# Patient Record
Sex: MALE | Race: WHITE | NOT HISPANIC OR LATINO | Employment: UNEMPLOYED | URBAN - METROPOLITAN AREA
[De-identification: names, ages, dates, MRNs, and addresses within clinical notes are randomized per-mention and may not be internally consistent; named-entity substitution may affect disease eponyms.]

---

## 2017-06-30 ENCOUNTER — HOSPITAL ENCOUNTER (EMERGENCY)
Facility: MEDICAL CENTER | Age: 42
End: 2017-06-30

## 2017-08-04 ENCOUNTER — OFFICE VISIT (OUTPATIENT)
Dept: INTERNAL MEDICINE | Facility: MEDICAL CENTER | Age: 42
End: 2017-08-04

## 2017-08-04 VITALS
SYSTOLIC BLOOD PRESSURE: 160 MMHG | DIASTOLIC BLOOD PRESSURE: 90 MMHG | OXYGEN SATURATION: 96 % | TEMPERATURE: 99.5 F | BODY MASS INDEX: 27.2 KG/M2 | HEIGHT: 70 IN | WEIGHT: 190 LBS | HEART RATE: 110 BPM

## 2017-08-04 DIAGNOSIS — K90.0 CELIAC DISEASE: ICD-10-CM

## 2017-08-04 DIAGNOSIS — J45.20 MILD INTERMITTENT ASTHMA WITHOUT COMPLICATION: ICD-10-CM

## 2017-08-04 DIAGNOSIS — G89.29 CHRONIC LEFT-SIDED LOW BACK PAIN WITH SCIATICA, SCIATICA LATERALITY UNSPECIFIED: ICD-10-CM

## 2017-08-04 DIAGNOSIS — G44.029 CHRONIC CLUSTER HEADACHE, NOT INTRACTABLE: ICD-10-CM

## 2017-08-04 DIAGNOSIS — M54.40 CHRONIC LEFT-SIDED LOW BACK PAIN WITH SCIATICA, SCIATICA LATERALITY UNSPECIFIED: ICD-10-CM

## 2017-08-04 DIAGNOSIS — F11.90 CHRONIC, CONTINUOUS USE OF OPIOIDS: ICD-10-CM

## 2017-08-04 PROCEDURE — 99204 OFFICE O/P NEW MOD 45 MIN: CPT | Mod: GC | Performed by: INTERNAL MEDICINE

## 2017-08-04 RX ORDER — SUMATRIPTAN 6 MG/.5ML
6 INJECTION, SOLUTION SUBCUTANEOUS ONCE
Qty: 0.5 ML | Refills: 3 | Status: SHIPPED
Start: 2017-08-04 | End: 2017-08-04

## 2017-08-04 RX ORDER — VERAPAMIL HYDROCHLORIDE 240 MG/1
240 CAPSULE, EXTENDED RELEASE ORAL DAILY
Qty: 30 TAB | Refills: 1 | Status: SHIPPED | OUTPATIENT
Start: 2017-08-04 | End: 2017-08-04 | Stop reason: SDUPTHER

## 2017-08-04 RX ORDER — VERAPAMIL HYDROCHLORIDE 240 MG/1
240 CAPSULE, EXTENDED RELEASE ORAL DAILY
Qty: 30 TAB | Refills: 1 | Status: SHIPPED
Start: 2017-08-04 | End: 2018-03-08

## 2017-08-04 RX ORDER — SUMATRIPTAN 100 MG/1
100 TABLET, FILM COATED ORAL
Qty: 10 TAB | Refills: 2 | Status: SHIPPED
Start: 2017-08-04 | End: 2018-03-08 | Stop reason: SDUPTHER

## 2017-08-04 RX ORDER — SUMATRIPTAN 6 MG/.5ML
6 INJECTION, SOLUTION SUBCUTANEOUS ONCE
COMMUNITY
End: 2017-08-04 | Stop reason: SDUPTHER

## 2017-08-04 RX ORDER — SUMATRIPTAN 100 MG/1
100 TABLET, FILM COATED ORAL
COMMUNITY
End: 2017-08-04 | Stop reason: SDUPTHER

## 2017-08-04 RX ORDER — OXYCODONE HYDROCHLORIDE 15 MG/1
15 TABLET ORAL EVERY 4 HOURS PRN
COMMUNITY

## 2017-08-04 RX ORDER — ACYCLOVIR 400 MG/1
400 TABLET ORAL DAILY
COMMUNITY
Start: 2017-05-13 | End: 2017-09-08 | Stop reason: SDUPTHER

## 2017-08-04 ASSESSMENT — PATIENT HEALTH QUESTIONNAIRE - PHQ9: CLINICAL INTERPRETATION OF PHQ2 SCORE: 0

## 2017-08-04 ASSESSMENT — PAIN SCALES - GENERAL: PAINLEVEL: 5=MODERATE PAIN

## 2017-08-04 NOTE — PATIENT INSTRUCTIONS
"Start taking verapamil 240 mg every day.   You can take a \" migraine cocktail\" 1 tabs excedrin migraine + 2 coffee shots or caffeine tabs+ ibuprofen 400 mg before the start of the headache. Take it with meals.   RTC in 5 weeks.   "

## 2017-08-04 NOTE — PROGRESS NOTES
New Patient to Establish    Reason to establish: New patient to establish    CC: headaches and medication refill    HPI: 41-year-old CM with PMH of cluster HA, mild intermittent Asthma and celiac disease, chronic SI joint pain, presents to the clinic for the refill of sumatriptan tabs ans SC injections and pain clinic referral for narcotics  Pt has recently moved from [place] to [place]. He was under care of urologist for his cluster headache and pain management clinic for physical. Prescription for chronic L SI joint pain.     Cluster HAs: Pt has a FHx for Cluster HAs, his mom, in 40s, had cluster headaches for 5 years, which resolved. Patient started having cluster headaches 3 years back. He reports these are seasonal usually in month of July/August for 4-6 weeks and then resolves. His neurologist was giving him sumatriptan tablet and sumatriptan SC injections. She reports that injections have been greatly helpful but they are too expensive and is difficult for him to afford his open for some cheaper alternative. Patient also asked for zolmitriptan.  He reports he has tried verapamil and past not sure if it helped and not sure why he stopped using it, no other preventive medication. Pt has tried caffeine pills that is not that effective.   His HA is dominantly on the L side with L eye lacrimation with sentitvity for the noise. No pre-ictal or post ictal confusion. No n/v, photophobia, bowel or bladder changes.     Chronic SI joint pain: Pt has chronic SI joint. He has tried radio- obliteration and multiple other non invasive procedures and PT to control pain, eventually he was prescribed Oxy IR q 4 for the pain relief, not on any long acting pain med.  say nothing helps otherwise. Pt needs a referral to pain clinic for the narcotic prescription.     Intermittent asthma: Since childhood. controlled, uses occasional Albuterol. Don't recall and was the last time he used it     Celiac disease: Recently diagnosed, no  symptoms currently,  on gluten-free diet. Mother has celiac disease.          There are no active problems to display for this patient.      Past Medical History   Diagnosis Date   • Asthma      since childhood. well controlled. occasionally albuterol   • Migraine    • Arthritis      SI joint    • Spina bifida (CMS-HCC)      congenital   • Celiac disease      glutean free diet       Current Outpatient Prescriptions   Medication Sig Dispense Refill   • oxycodone (OXY-IR) 15 MG immediate release tablet Take 15 mg by mouth every four hours as needed for Severe Pain.     • SUMAtriptan Succinate (IMITREX) 6 MG/0.5ML Solution Inject 0.5 mL as instructed Once for 1 dose. 0.5 mL 3   • sumatriptan (IMITREX) 100 MG tablet Take 1 Tab by mouth Once PRN for Migraine. 10 Tab 2   • Verapamil HCl  MG CAPSULE SR 24 HR Take 1 Cap by mouth every day. 30 Tab 1   • acyclovir (ZOVIRAX) 400 MG tablet Take 400 mg by mouth every day.       No current facility-administered medications for this visit.       Allergies as of 08/04/2017   • (No Known Allergies)       Social History     Social History   • Marital Status: Single     Spouse Name: N/A   • Number of Children: N/A   • Years of Education: N/A     Occupational History   • Not on file.     Social History Main Topics   • Smoking status: Never Smoker    • Smokeless tobacco: Never Used   • Alcohol Use: No   • Drug Use: No   • Sexual Activity:     Partners: Female     Birth Control/ Protection: Condom     Other Topics Concern   • Not on file     Social History Narrative   • No narrative on file       Family History   Problem Relation Age of Onset   • No Known Problems Mother        History reviewed. No pertinent past surgical history.    ROS: As per HPI. Additional pertinent symptoms as noted below.  Constitutional: negative for fever and malaise/fatigue. Negative for chills, weight loss and diaphoresis.   HENT: HA L sided. Negative for nosebleeds and sore throat.    Eyes: Negative for  "blurred vision and redness.   Respiratory: Negative for cough, hemoptysis and shortness of breath.    Cardiovascular: Negative for chest pain, palpitations, orthopnea and leg swelling.   Gastrointestinal: Negative for heartburn, nausea, vomiting and abdominal pain.   Genitourinary: Negative for dysuria, frequency and hematuria.   Musculoskeletal: Positive for back pain. Negative for myalgias and joint pain.   Skin: Negative for itching and rash.   Neurological: Positive for headaches. Negative for dizziness, tingling, sensory change, focal weakness, seizures and weakness.   Endo/Heme/Allergies: Does not bruise/bleed easily.   Psychiatric/Behavioral: Negative for depression and substance abuse.     /90 mmHg  Pulse 110  Temp(Src) 37.5 °C (99.5 °F)  Ht 1.778 m (5' 10\")  Wt 86.183 kg (190 lb)  BMI 27.26 kg/m2  SpO2 96%    Physical Exam  General:  Alert and oriented, No apparent distress.    Eyes: Pupils equal and reactive. No scleral icterus.    Throat: Clear no erythema or exudates noted.    Neck: Supple. No lymphadenopathy noted. Thyroid not enlarged.    Lungs: Clear to auscultation and percussion bilaterally.    Cardiovascular: Regular rate and rhythm. No murmurs, rubs or gallops.    Abdomen:  Benign. No rebound or guarding noted.    Extremities: No clubbing, cyanosis, edema.    Skin: Clear. No rash or suspicious skin lesions noted.      Note: I have reviewed all pertinent labs and diagnostic tests associated with this visit with specific comments listed under the assessment and plan below    Assessment and Plan    1. Chronic cluster headache  - seasonal for 4-6 weeks during last summer. Is on abortificant only not on preventive medication  Plan:   - verapamil 240 mg q D, f/u to titrate the dose. Use preventive med before chinmay Ha season ans stop the use after the season of HA ends.  - sumatriptan for acute intevention- refilled 4 dose of SC injection and 10 tabs of Sumatriptan pill  - pt was suggested to " try migraine cocktail - excedrin tab + 2 coffee shots/ tab + ibuprofen 400-600 mg   - follow up closely for verapamil dose titration    2. Chronic left-sided low back pain with sciatica, sciatica laterality unspecified  - pt was asked to provide urine sample for the millenium U tox. Pt deferred saying he dont want to pay out of pocket for the test on this visit, as he is self pay.   - pt was asked to obtain records form the previous pain clinic and bring all the pain meds at pain clinic visit    3. Isolated high blood pressure  - most likely 2/2 pain. Pt was asked to be mindful dietry salt intake and check his BP regularly.  - Verapamil has additional BP effect, will montior    4. Celiac disease  - Stable, avoid gluten diet    5. Mild intermittent asthma  - Well-controlled, has albuterol inhaler prn    6. Health maintenance  Based on his age she needs screening for STI's (not a High risk), and vaccinations  - Patient deferred util next time as he has no insurance right now. He reports he is trying for obtaining insurance and then  he will discuss any other tesing of vaccination.         Followup: Return in about 5 weeks (around 9/8/2017).    Risk Assessment (discuss potential complications a function of chronic problems): discussed    Complexity (discuss number of co-morbidities): 2    Signed by: Casa Helm M.D.

## 2017-08-04 NOTE — MR AVS SNAPSHOT
"        [facility]   2017 9:00 AM   Office Visit   MRN: [id number]    Department:  [facility]   Dept Phone:  [phone]    Description:  Male : 1975   Provider:  Casa Helm M.D.           Reason for Visit     Establish Care new to you     Migraine 3 to 4 years     Medication Refill imitrex, sumatriptan    Referral Needed pain clinic       Allergies as of 2017     No Known Allergies      You were diagnosed with     Chronic cluster headache, not intractable   [212206]       Chronic left-sided low back pain with sciatica, sciatica laterality unspecified   [5208321]       Celiac disease   [579.0.ICD-9-CM]         Vital Signs     Blood Pressure Pulse Temperature Height Weight Body Mass Index    160/90 mmHg 110 37.5 °C (99.5 °F) 1.778 m (5' 10\") 86.183 kg (190 lb) 27.26 kg/m2    Oxygen Saturation Smoking Status                96% Never Smoker           Basic Information     Date Of Birth Sex Race Ethnicity Preferred Language    1975 Male White Non- English      Health Maintenance        Date Due Completion Dates    IMM DTaP/Tdap/Td Vaccine (1 - Tdap) 1993 ---    IMM INFLUENZA (1) 2017 ---            Current Immunizations     No immunizations on file.      Below and/or attached are the medications your provider expects you to take. Review all of your home medications and newly ordered medications with your provider and/or pharmacist. Follow medication instructions as directed by your provider and/or pharmacist. Please keep your medication list with you and share with your provider. Update the information when medications are discontinued, doses are changed, or new medications (including over-the-counter products) are added; and carry medication information at all times in the event of emergency situations     Allergies:  No Known Allergies          Medications  Valid as of: 2017 - 10:39 AM    Generic Name Brand Name Tablet Size Instructions " for use    Acyclovir (Tab) ZOVIRAX 400 MG Take 400 mg by mouth every day.        OxyCODONE HCl (Tab) OXY-IR 15 MG Take 15 mg by mouth every four hours as needed for Severe Pain.        SUMAtriptan Succinate (Solution) IMITREX 6 MG/0.5ML Inject 0.5 mL as instructed Once for 1 dose.        SUMAtriptan Succinate (Tab) IMITREX 100 MG Take 1 Tab by mouth Once PRN for Migraine.        Verapamil HCl (CAPSULE SR 24 HR) Verapamil HCl  MG Take 1 Cap by mouth every day.        .                 Medicines prescribed today were sent to:     None      Medication refill instructions:       If your prescription bottle indicates you have medication refills left, it is not necessary to call your provider’s office. Please contact your pharmacy and they will refill your medication.    If your prescription bottle indicates you do not have any refills left, you may request refills at any time through one of the following ways: The online [employer] system (except Urgent Care), by calling your provider’s office, or by asking your pharmacy to contact your provider’s office with a refill request. Medication refills are processed only during regular business hours and may not be available until the next business day. Your provider may request additional information or to have a follow-up visit with you prior to refilling your medication.   *Please Note: Medication refills are assigned a new Rx number when refilled electronically. Your pharmacy may indicate that no refills were authorized even though a new prescription for the same medication is available at the pharmacy. Please request the medicine by name with the pharmacy before contacting your provider for a refill.        Your To Do List     Future Labs/Procedures Complete By [employer] PAIN MANAGEMENT SCREEN  As directed 8/4/2018    Comments:    Current Meds (name, sig, last dose):   Current outpatient prescriptions:   •  sumatriptan, 100 mg, Oral, Once PRN  •  SUMAtriptan  "Succinate, 6 mg, Subcutaneous, Once  •  oxycodone, 15 mg, Oral, Q4HRS PRN  •  acyclovir, 400 mg, Oral, DAILY            Referral     A referral request has been sent to our patient care coordination department. Please allow 3-5 business days for us to process this request and contact you either by phone or mail. If you do not hear from us by the 5th business day, please call us at [phone].        Instructions    Start taking verapamil 240 mg every day.   You can take a \" migraine cocktail\" 1 tabs excedrin migraine + 2 coffee shots or caffeine tabs+ ibuprofen 400 mg before the start of the headache. Take it with meals.   RTC in 5 weeks.           [employer] Access Code: Activation code not generated  Current [employer] Status: Active          "

## 2018-03-08 ENCOUNTER — OFFICE VISIT (OUTPATIENT)
Dept: INTERNAL MEDICINE | Facility: MEDICAL CENTER | Age: 43
End: 2018-03-08
Payer: COMMERCIAL

## 2018-03-08 VITALS
WEIGHT: 180.25 LBS | HEART RATE: 74 BPM | HEIGHT: 70 IN | DIASTOLIC BLOOD PRESSURE: 78 MMHG | OXYGEN SATURATION: 94 % | SYSTOLIC BLOOD PRESSURE: 122 MMHG | TEMPERATURE: 99.4 F | BODY MASS INDEX: 25.8 KG/M2

## 2018-03-08 DIAGNOSIS — G43.809 OTHER MIGRAINE WITHOUT STATUS MIGRAINOSUS, NOT INTRACTABLE: ICD-10-CM

## 2018-03-08 DIAGNOSIS — M41.9 SCOLIOSIS, UNSPECIFIED SCOLIOSIS TYPE, UNSPECIFIED SPINAL REGION: ICD-10-CM

## 2018-03-08 DIAGNOSIS — G44.029 CHRONIC CLUSTER HEADACHE, NOT INTRACTABLE: ICD-10-CM

## 2018-03-08 DIAGNOSIS — B35.0 TINEA CAPITIS: ICD-10-CM

## 2018-03-08 DIAGNOSIS — J45.20 MILD INTERMITTENT ASTHMA WITHOUT COMPLICATION: ICD-10-CM

## 2018-03-08 DIAGNOSIS — G89.29 CHRONIC LEFT-SIDED LOW BACK PAIN WITH SCIATICA, SCIATICA LATERALITY UNSPECIFIED: ICD-10-CM

## 2018-03-08 DIAGNOSIS — A60.00 HERPES SIMPLEX INFECTION OF GENITOURINARY SYSTEM: ICD-10-CM

## 2018-03-08 DIAGNOSIS — M54.40 CHRONIC LEFT-SIDED LOW BACK PAIN WITH SCIATICA, SCIATICA LATERALITY UNSPECIFIED: ICD-10-CM

## 2018-03-08 PROBLEM — G43.909 MIGRAINE WITHOUT STATUS MIGRAINOSUS, NOT INTRACTABLE: Status: ACTIVE | Noted: 2018-03-08

## 2018-03-08 PROCEDURE — 99214 OFFICE O/P EST MOD 30 MIN: CPT | Mod: GC | Performed by: INTERNAL MEDICINE

## 2018-03-08 RX ORDER — SUMATRIPTAN 100 MG/1
100 TABLET, FILM COATED ORAL
Qty: 10 TAB | Refills: 0 | Status: SHIPPED | OUTPATIENT
Start: 2018-03-08 | End: 2018-04-18 | Stop reason: SDUPTHER

## 2018-03-08 RX ORDER — ACYCLOVIR 400 MG/1
400 TABLET ORAL DAILY
Qty: 30 TAB | Refills: 0 | Status: SHIPPED | OUTPATIENT
Start: 2018-03-08 | End: 2018-03-14 | Stop reason: SDUPTHER

## 2018-03-08 RX ORDER — TERBINAFINE HYDROCHLORIDE 250 MG/1
250 TABLET ORAL DAILY
Qty: 42 TAB | Refills: 0 | Status: SHIPPED | OUTPATIENT
Start: 2018-03-08 | End: 2018-04-18

## 2018-03-08 RX ORDER — ALBUTEROL SULFATE 90 UG/1
2 AEROSOL, METERED RESPIRATORY (INHALATION) EVERY 6 HOURS PRN
Qty: 8.5 G | Refills: 1 | Status: SHIPPED | OUTPATIENT
Start: 2018-03-08 | End: 2018-04-18 | Stop reason: SDUPTHER

## 2018-03-08 RX ORDER — MORPHINE SULFATE 10 MG/1
CAPSULE, EXTENDED RELEASE ORAL 2 TIMES DAILY
COMMUNITY

## 2018-03-08 RX ORDER — CEFUROXIME AXETIL 250 MG/1
6 TABLET ORAL
COMMUNITY
Start: 2017-02-24 | End: 2018-03-08 | Stop reason: SDUPTHER

## 2018-03-08 RX ORDER — CEFUROXIME AXETIL 250 MG/1
6 TABLET ORAL
Qty: 6 SYRINGE | Refills: 0 | Status: SHIPPED | OUTPATIENT
Start: 2018-03-08 | End: 2018-04-18 | Stop reason: SDUPTHER

## 2018-03-09 NOTE — PROGRESS NOTES
Established Patient    Rustam presents today with the following:    CC: Scalp itching, medication refill    HPI:    41-year-old gentleman with past medical history of migraine, intermittent mild persistent asthma, chronic back pain/scoliosis and chronic opioid use came in for above-mentioned reasons. The patient noticed scalp itching with multiple lesions on his scalp for 3 weeks. He does not remember any sick contacts and lives with his 2 daughters at home. He does not remember any recent travel. There are multiple lesions which are itchy. There is no pus or discharge and there are no open sores. There are no other lesions. He also endorses cervical lymphadenopathy. He denies rash, shortness of breath, hives. His headache is well controlled. He has chronic genital herpes and is on chronic acyclovir. His asthma is well controlled. He gets his pain medication from pain specialist.        Patient Active Problem List    Diagnosis Date Noted   • Tinea capitis 03/08/2018   • Scoliosis 03/08/2018   • Migraine without status migrainosus, not intractable 03/08/2018   • Herpes simplex infection of genitourinary system 03/08/2018   • Chronic cluster headache, not intractable 08/04/2017   • Chronic left-sided low back pain with sciatica 08/04/2017   • Celiac disease 08/04/2017   • Mild intermittent asthma without complication 08/04/2017   • Chronic, continuous use of opioids 08/04/2017       Current Outpatient Prescriptions   Medication Sig Dispense Refill   • Morphine Sulfate 10 MG CAPSULE SR 24 HR Take  by mouth 2 Times a Day.     • terbinafine (LAMISIL) 250 MG Tab Take 1 Tab by mouth every day. 42 Tab 0   • acyclovir (ZOVIRAX) 400 MG tablet Take 1 Tab by mouth every day. 30 Tab 0   • sumatriptan (IMITREX) 100 MG tablet Take 1 Tab by mouth Once PRN for Migraine. 10 Tab 0   • SUMAtriptan Succinate (IMITREX STATDOSE SYSTEM) 6 MG/0.5ML Solution Auto-injector Inject 6 mg as instructed Once PRN. 6 Syringe 0   • albuterol  "108 (90 Base) MCG/ACT Aero Soln inhalation aerosol Inhale 2 Puffs by mouth every 6 hours as needed for Shortness of Breath. 8.5 g 1   • oxycodone (OXY-IR) 15 MG immediate release tablet Take 15 mg by mouth every four hours as needed for Severe Pain.       No current facility-administered medications for this visit.        ROS: As per HPI. Additional pertinent symptoms as noted below.    GI: Denies nausea vomiting or diarrhea  Cardiovascular: Denies chest pain shortness of breath orthopnea or PND  Lungs: Denies cough phlegm or fever  Skeletal: Denies new joint swelling or pain  Neuro: No focal deficits, numbness tingling  Psychiatric: Normal mood  Skin: No rash: Multiple scalp lesion, also acne on the face    /78   Pulse 74   Temp 37.4 °C (99.4 °F)   Ht 1.778 m (5' 10\")   Wt 81.8 kg (180 lb 4 oz)   SpO2 94%   BMI 25.86 kg/m²     Physical Exam   Constitutional:  oriented to person, place, and time. No distress.   Eyes: Pupils are equal, round, and reactive to light. No scleral icterus.  Neck: Neck supple. No thyromegaly present. :     Juguloomohoid lymph node on the Rt side and anterior cervical lymph node on the left side  Cardiovascular: Normal rate, regular rhythm and normal heart sounds.  Exam reveals no gallop and no friction rub.  No murmur heard.    Pulmonary/Chest: Breath sounds normal. Chest wall is not dull to percussion.   Musculoskeletal:   no edema.   Lymphadenopathy: no cervical adenopathy  Neurological: alert and oriented to person, place, and time.     Skin: No cyanosis. Nails show no clubbing.  Multiple 0.5-1 cm scalp lesions, associated hair loss patch, no open lesions or sore  Nodular acne on the face      Note: I have reviewed all pertinent labs and diagnostic tests associated with this visit with specific comments listed under the assessment and plan below    Assessment and Plan    Tinea capitis with hair loss  Acne  We will start him on terbinafine 250 mg daily for 6 weeks, educated " about side effects  Will get CBC and chemistry panel  For acne will referred to dermatology  Follow-up in 10 weeks  Educated that he needs to get his daughters examined for any scalp fungal infection    Chronic cluster headache, not intractable  Migraine  Overall headache is well controlled, no recent attacks  Refill his Imitrex    Asthma  Refill albuterol inhaler, get CBC and chemistry panel    Genital herpes  Refill acyclovir  Get CBC and chemistry panel    Chronic back pain  Scoliosis  Preventative care  He is taking his pain medication from pain specialist: Does not endorse any side effects  Follow-up with PCP in 10 weeks  Defer preventative care to PCP      Follow-up in 10 weeks    Signed by: Teresa Gordillo M.D.

## 2018-03-14 RX ORDER — ACYCLOVIR 400 MG/1
400 TABLET ORAL DAILY
Qty: 90 TAB | Refills: 1 | Status: SHIPPED | OUTPATIENT
Start: 2018-03-14

## 2018-03-14 NOTE — TELEPHONE ENCOUNTER
Pt called again regarding same message below.. Pt would like pharmacy [facility] on [phone] [facility]

## 2018-03-14 NOTE — TELEPHONE ENCOUNTER
1. Caller Name: Rustam                      Call Back Number: [phone] (home)       2. Message: Pt called and l/m. Pt requesting his rx Acyclovir a 90 day supply for a years worth. . Pt on v/m stated he would like to go to [facility] on [address].    3. Patient approves office to leave a detailed voicemail/MyChart message: N\A    There is a [facility] [address] [facility]. Called pt. No answer. Mailbox is full.

## 2018-04-18 ENCOUNTER — OFFICE VISIT (OUTPATIENT)
Dept: INTERNAL MEDICINE | Facility: MEDICAL CENTER | Age: 43
End: 2018-04-18
Payer: COMMERCIAL

## 2018-04-18 VITALS
SYSTOLIC BLOOD PRESSURE: 120 MMHG | WEIGHT: 183.4 LBS | DIASTOLIC BLOOD PRESSURE: 80 MMHG | OXYGEN SATURATION: 97 % | HEART RATE: 85 BPM | HEIGHT: 70 IN | TEMPERATURE: 99.2 F | BODY MASS INDEX: 26.26 KG/M2

## 2018-04-18 DIAGNOSIS — J45.20 MILD INTERMITTENT ASTHMA WITHOUT COMPLICATION: ICD-10-CM

## 2018-04-18 DIAGNOSIS — D22.9 NEVUS: ICD-10-CM

## 2018-04-18 DIAGNOSIS — G44.029 CHRONIC CLUSTER HEADACHE, NOT INTRACTABLE: ICD-10-CM

## 2018-04-18 DIAGNOSIS — L70.9 ACNE, UNSPECIFIED ACNE TYPE: ICD-10-CM

## 2018-04-18 DIAGNOSIS — G43.809 OTHER MIGRAINE WITHOUT STATUS MIGRAINOSUS, NOT INTRACTABLE: ICD-10-CM

## 2018-04-18 PROCEDURE — 99214 OFFICE O/P EST MOD 30 MIN: CPT | Mod: GC | Performed by: INTERNAL MEDICINE

## 2018-04-18 RX ORDER — SUMATRIPTAN 100 MG/1
100 TABLET, FILM COATED ORAL
Qty: 9 TAB | Refills: 0 | Status: SHIPPED | OUTPATIENT
Start: 2018-04-18

## 2018-04-18 RX ORDER — ALBUTEROL SULFATE 90 UG/1
2 AEROSOL, METERED RESPIRATORY (INHALATION) EVERY 6 HOURS PRN
Qty: 8.5 G | Refills: 1 | Status: SHIPPED | OUTPATIENT
Start: 2018-04-18

## 2018-04-18 RX ORDER — CEFUROXIME AXETIL 250 MG/1
6 TABLET ORAL
Qty: 4 SYRINGE | Refills: 0 | Status: SHIPPED | OUTPATIENT
Start: 2018-04-18

## 2018-04-18 RX ORDER — DOXYCYCLINE HYCLATE 100 MG
100 TABLET ORAL 2 TIMES DAILY
Qty: 20 TAB | Refills: 0 | Status: SHIPPED | OUTPATIENT
Start: 2018-04-18 | End: 2018-05-02

## 2018-04-18 RX ORDER — ALBUTEROL SULFATE 90 UG/1
2 AEROSOL, METERED RESPIRATORY (INHALATION) EVERY 6 HOURS PRN
Qty: 8.5 G | Refills: 1 | Status: SHIPPED | OUTPATIENT
Start: 2018-04-18 | End: 2018-04-18

## 2018-04-18 ASSESSMENT — PAIN SCALES - GENERAL: PAINLEVEL: 7=MODERATE-SEVERE PAIN

## 2018-04-18 NOTE — PROGRESS NOTES
Established Patient    Rustam presents today with the following:    CC: Scalp lesions    HPI: Patient is a 42-year-old male with past medical history of migraine headaches, asthma, and chronic back pain who presents for chief complaint of scalp lesions. Patient was seen in our office about 4 weeks ago for same lesions and was started on terbinafine for possible tenia capitis. Reports no improvement in his lesions. One of the lesions on the back of his neck ruptured giving a purulent discharge. Patient denied any fevers or chills. Patient also asking for refills of his migraine and asthma medications. Patient also asking for a referral to dermatology for a mole in his face. Patient otherwise denied any complaints for today.    Patient Active Problem List    Diagnosis Date Noted   • Tinea capitis 03/08/2018   • Scoliosis 03/08/2018   • Migraine without status migrainosus, not intractable 03/08/2018   • Herpes simplex infection of genitourinary system 03/08/2018   • Chronic cluster headache, not intractable 08/04/2017   • Chronic left-sided low back pain with sciatica 08/04/2017   • Celiac disease 08/04/2017   • Mild intermittent asthma without complication 08/04/2017   • Chronic, continuous use of opioids 08/04/2017       Current Outpatient Prescriptions   Medication Sig Dispense Refill   • SUMAtriptan Succinate (IMITREX STATDOSE SYSTEM) 6 MG/0.5ML Solution Auto-injector Inject 6 mg as instructed Once PRN. 4 Syringe 0   • sumatriptan (IMITREX) 100 MG tablet Take 1 Tab by mouth Once PRN for Migraine. 9 Tab 0   • doxycycline (VIBRAMYCIN) 100 MG Tab Take 1 Tab by mouth 2 times a day. 20 Tab 0   • albuterol (VENTOLIN HFA) 108 (90 Base) MCG/ACT Aero Soln inhalation aerosol Inhale 2 Puffs by mouth every 6 hours as needed for Shortness of Breath. 8.5 g 1   • acyclovir (ZOVIRAX) 400 MG tablet Take 1 Tab by mouth every day. 90 Tab 1   • oxycodone (OXY-IR) 15 MG immediate release tablet Take 15 mg by mouth every four hours  "as needed for Severe Pain.     • Morphine Sulfate 10 MG CAPSULE SR 24 HR Take  by mouth 2 Times a Day.       No current facility-administered medications for this visit.            Review of Systems:     Constitutional: Denies fevers, Denies weight changes  Eyes: Denies changes in vision, no eye pain  Ears/Nose/Throat/Mouth: Denies nasal congestion or sore throat   Cardiovascular: Denies chest pain or palpitations   Respiratory: Denies shortness of breath , Denies cough  Gastrointestinal/Hepatic: Denies abdominal pain, nausea, vomiting, diarrhea or constipation.  Genitourinary: Denies bladder dysfunction, dysuria or frequency  Musculoskeletal/Rheum: Denies  joint pain and swelling   Skin: scalp skin lesions  Neurological: Denies headache, confusion, memory loss or focal weakness/parasthesias              /80   Pulse 85   Temp 37.3 °C (99.2 °F)   Ht 1.778 m (5' 10\")   Wt 83.2 kg (183 lb 6.4 oz)   SpO2 97%   BMI 26.32 kg/m²     Physical Exam   Constitutional:  Comfortable. No acute distress.   Eyes: Pupils are equal, round, and reactive to light. No scleral icterus.  Neck: Neck supple. No thyromegaly present.   Cardiovascular: Normal rate, regular rhythm and normal heart sounds.  Exam reveals no gallop and no friction rub.  No murmur heard.  Pulmonary/Chest: Lungs clear to ascultation bilaterally. Breath sounds normal. No rhonchi, wheezes or crackles.   Musculoskeletal:   No deformity noted. no edema.   Lymphadenopathy: no cervical adenopathy  Neurological: alert and oriented to person, place, and time.  No obvious motor or sensory deficits.  Extremities: No edema. No clubbing. No cyanosis. Distal pulses 2+ bilaterally.  Skin: three papular lesions affecting the scalp. No fluctuation. One of the lesions is scabbed. Nevus on right Cheek.       Assessment and Plan    1. Acne, unspecified acne type  -No response to terbinafine.  -No active border suggesting fungal infection.  -Likely acne versus for colitis " versus cellulitis of the scalp.  -We'll treat with a course of doxycycline.  -We'll reevaluate in 2 weeks to ensure improvement or resolution.  - doxycycline (VIBRAMYCIN) 100 MG Tab; Take 1 Tab by mouth 2 times a day.  Dispense: 20 Tab; Refill: 0    2. Other migraine without status migrainosus, not intractable  -1-2 episodes per month  -Currently on sumatriptan injection and pills. Will renew.  - SUMAtriptan Succinate (IMITREX STATDOSE SYSTEM) 6 MG/0.5ML Solution Auto-injector; Inject 6 mg as instructed Once PRN.  Dispense: 4 Syringe; Refill: 0  - sumatriptan (IMITREX) 100 MG tablet; Take 1 Tab by mouth Once PRN for Migraine.  Dispense: 9 Tab; Refill: 0    3. Mild intermittent asthma without complication  -Not in exacerbation.  -Currently on albuterol as needed. Using it very occasionally. Will resume.  - albuterol (VENTOLIN HFA) 108 (90 Base) MCG/ACT Aero Soln inhalation aerosol; Inhale 2 Puffs by mouth every 6 hours as needed for Shortness of Breath.  Dispense: 8.5 g; Refill: 1    4. Nevus   -Affecting right cheek.  -Dermatology referral                          Signed by: Deana Lloyd M.D.

## 2018-05-01 ENCOUNTER — APPOINTMENT (OUTPATIENT)
Dept: MEDICAL GROUP | Facility: MEDICAL CENTER | Age: 43
End: 2018-05-01
Payer: COMMERCIAL

## 2018-05-02 ENCOUNTER — OFFICE VISIT (OUTPATIENT)
Dept: INTERNAL MEDICINE | Facility: MEDICAL CENTER | Age: 43
End: 2018-05-02
Payer: COMMERCIAL

## 2018-05-02 VITALS
SYSTOLIC BLOOD PRESSURE: 110 MMHG | DIASTOLIC BLOOD PRESSURE: 90 MMHG | BODY MASS INDEX: 27.52 KG/M2 | TEMPERATURE: 99.2 F | OXYGEN SATURATION: 94 % | WEIGHT: 192.25 LBS | HEIGHT: 70 IN | HEART RATE: 99 BPM

## 2018-05-02 DIAGNOSIS — L70.9 ACNE, UNSPECIFIED ACNE TYPE: ICD-10-CM

## 2018-05-02 PROCEDURE — 99213 OFFICE O/P EST LOW 20 MIN: CPT | Mod: GE | Performed by: INTERNAL MEDICINE

## 2018-05-02 RX ORDER — DOXYCYCLINE HYCLATE 100 MG
100 TABLET ORAL 2 TIMES DAILY
Qty: 14 TAB | Refills: 0 | Status: SHIPPED | OUTPATIENT
Start: 2018-05-02

## 2018-05-02 NOTE — PATIENT INSTRUCTIONS
- Doxycycline for 7 days twice daily  - Follow up with PCP in one month  - Come back if symptome worsen

## 2018-05-03 PROBLEM — L70.9 ACNE: Status: ACTIVE | Noted: 2018-05-03

## 2018-05-03 NOTE — PROGRESS NOTES
Established Patient    Rustam presents today with the following:    CC: Scalp lesions    HPI: 41 y/o M w/PMH of migraine headaches, asthma, and chronic back pain present to clinic for Scal lesion f/u for which he was placed on a 10 day BID doxycycline regime which he completed 4 days ago. He reports improvement regarding lesion tenderness and pruritis but is still notecing some areas of alopecia and pus expression from some of the lesions. He denies F/C, night sweats, fatigue, changes in appetite or weight.       Patient Active Problem List    Diagnosis Date Noted   • Acne 05/03/2018   • Tinea capitis 03/08/2018   • Scoliosis 03/08/2018   • Migraine without status migrainosus, not intractable 03/08/2018   • Herpes simplex infection of genitourinary system 03/08/2018   • Chronic cluster headache, not intractable 08/04/2017   • Chronic left-sided low back pain with sciatica 08/04/2017   • Celiac disease 08/04/2017   • Mild intermittent asthma without complication 08/04/2017   • Chronic, continuous use of opioids 08/04/2017       Current Outpatient Prescriptions   Medication Sig Dispense Refill   • doxycycline (VIBRAMYCIN) 100 MG Tab Take 1 Tab by mouth 2 times a day. 14 Tab 0   • SUMAtriptan Succinate (IMITREX STATDOSE SYSTEM) 6 MG/0.5ML Solution Auto-injector Inject 6 mg as instructed Once PRN. 4 Syringe 0   • sumatriptan (IMITREX) 100 MG tablet Take 1 Tab by mouth Once PRN for Migraine. 9 Tab 0   • albuterol (VENTOLIN HFA) 108 (90 Base) MCG/ACT Aero Soln inhalation aerosol Inhale 2 Puffs by mouth every 6 hours as needed for Shortness of Breath. 8.5 g 1   • acyclovir (ZOVIRAX) 400 MG tablet Take 1 Tab by mouth every day. 90 Tab 1   • Morphine Sulfate 10 MG CAPSULE SR 24 HR Take  by mouth 2 Times a Day.     • oxycodone (OXY-IR) 15 MG immediate release tablet Take 15 mg by mouth every four hours as needed for Severe Pain.       No current facility-administered medications for this visit.        ROS: As per  "HPI. Additional pertinent symptoms as noted below.  Constitutional: no fevers, chills, weight change  Eyes: no blurred vision, discharge, eye pain  ENT: no rhinorrhea, sore throat, neck masses  Cardiovascular: no angina, palpitations, PND, orthopnea, edema  Respiratory: no cough, sputum, or dyspnea  GI: no nausea, vomiting, abdominal pain, constipation, or diarrhea  : no dysuria, hematuria, frequency   Musculo-skeletal: no joint or muscle pain  Neurological: no headaches, dizziness, motor/sensory loss  Psychological: no anxiety or depression      /90   Pulse 99   Temp 37.3 °C (99.2 °F)   Ht 1.778 m (5' 10\")   Wt 87.2 kg (192 lb 4 oz)   SpO2 94%   BMI 27.59 kg/m²     Physical Exam   Constitutional:  oriented to person, place, and time. No distress.   Eyes: Pupils are equal, round, and reactive to light. No scleral icterus.  Neck: Neck supple. No thyromegaly present.   Cardiovascular: Normal rate, regular rhythm and normal heart sounds.  Exam reveals no gallop and no friction rub.  No murmur heard.  Pulmonary/Chest: Breath sounds normal. Chest wall is not dull to percussion.   Musculoskeletal:   no edema.   Lymphadenopathy: no cervical adenopathy  Neurological: alert and oriented to person, place, and time.   Skin: 3 papular, mildly erythematous scalp lesions and alopecia. Pus accumulation noted for one lesion.        Note: I have reviewed all pertinent labs and diagnostic tests associated with this visit with specific comments listed under the assessment and plan below    Assessment and Plan    1. Acne, unspecified acne type  - Reports improvement with Doxycycline  - Likely acne vs forunculitis vs cellulitis of the scalp  - Still presenting with mild erythema and areas of alopecia   - Placed on a 7 day BID Doxycycline regimen  - F/U w/PCP in 1 month    Followup: Return in about 1 month (around 6/2/2018), or follow up with PCP.      Signed by: Kali Giang M.D.  "

## 2019-01-10 RX ORDER — ACYCLOVIR 400 MG/1
TABLET ORAL
Refills: 0 | OUTPATIENT
Start: 2019-01-10

## 2019-01-25 RX ORDER — ACYCLOVIR 400 MG/1
TABLET ORAL
Refills: 0 | OUTPATIENT
Start: 2019-01-25

## 2019-02-04 RX ORDER — ACYCLOVIR 400 MG/1
TABLET ORAL
Refills: 0 | OUTPATIENT
Start: 2019-02-04

## 2019-02-27 RX ORDER — ACYCLOVIR 400 MG/1
TABLET ORAL
Refills: 0 | OUTPATIENT
Start: 2019-02-27